# Patient Record
Sex: FEMALE | Race: WHITE | NOT HISPANIC OR LATINO | URBAN - METROPOLITAN AREA
[De-identification: names, ages, dates, MRNs, and addresses within clinical notes are randomized per-mention and may not be internally consistent; named-entity substitution may affect disease eponyms.]

---

## 2017-03-23 ENCOUNTER — OFFICE (OUTPATIENT)
Dept: URBAN - METROPOLITAN AREA CLINIC 11 | Facility: CLINIC | Age: 62
End: 2017-03-23

## 2017-03-23 VITALS
RESPIRATION RATE: 16 BRPM | SYSTOLIC BLOOD PRESSURE: 130 MMHG | HEART RATE: 65 BPM | HEIGHT: 64 IN | DIASTOLIC BLOOD PRESSURE: 68 MMHG | WEIGHT: 231 LBS

## 2017-03-23 DIAGNOSIS — R19.7 DIARRHEA, UNSPECIFIED: ICD-10-CM

## 2017-03-23 DIAGNOSIS — K22.70 BARRETT'S ESOPHAGUS WITHOUT DYSPLASIA: ICD-10-CM

## 2017-03-23 DIAGNOSIS — K75.81 NONALCOHOLIC STEATOHEPATITIS (NASH): ICD-10-CM

## 2017-03-23 LAB
NASH FIBROSURE: ALPHA 2-MACROGLOBULINS, QN: 137 MG/DL (ref 110–276)
NASH FIBROSURE: ALT (SGPT) P5P: 29 IU/L (ref 0–40)
NASH FIBROSURE: APOLIPOPROTEIN A-1: 164 MG/DL (ref 116–209)
NASH FIBROSURE: AST (SGOT) P5P: 24 IU/L (ref 0–40)
NASH FIBROSURE: BILIRUBIN, TOTAL: 0.7 MG/DL (ref 0–1.2)
NASH FIBROSURE: CHOLESTEROL, TOTAL: 208 MG/DL — HIGH (ref 100–199)
NASH FIBROSURE: COMMENT: (no result)
NASH FIBROSURE: FIBROSIS SCORE: 0.09 (ref 0–0.21)
NASH FIBROSURE: FIBROSIS SCORING: (no result)
NASH FIBROSURE: FIBROSIS STAGE: (no result)
NASH FIBROSURE: GGT: 13 IU/L (ref 0–60)
NASH FIBROSURE: GLUCOSE, SERUM: 113 MG/DL — HIGH (ref 65–99)
NASH FIBROSURE: HAPTOGLOBIN: 165 MG/DL (ref 34–200)
NASH FIBROSURE: HEIGHT: 64 INCHES
NASH FIBROSURE: INTERPRETATIONS: (no result)
NASH FIBROSURE: LIMITATIONS: (no result)
NASH FIBROSURE: NASH GRADE: (no result)
NASH FIBROSURE: NASH SCORE: 0.5 (ref 0.25–?)
NASH FIBROSURE: NASH SCORING: (no result)
NASH FIBROSURE: STEATOSIS GRADE: (no result)
NASH FIBROSURE: STEATOSIS GRADING: (no result)
NASH FIBROSURE: STEATOSIS SCORE: 0.82 — HIGH (ref 0–0.3)
NASH FIBROSURE: TRIGLYCERIDES: 171 MG/DL — HIGH (ref 0–149)
NASH FIBROSURE: WEIGHT: 231 LBS

## 2017-03-23 PROCEDURE — 99214 OFFICE O/P EST MOD 30 MIN: CPT

## 2017-03-24 LAB
BASIC METABOLIC PANEL: CALCIUM TOTAL: 10 MG/DL (ref 8.5–10.5)
BASIC METABOLIC PANEL: CARBON DIOXIDE: 27 MEQ/L (ref 21–31)
BASIC METABOLIC PANEL: CHLORIDE: 98 MEQ/L (ref 96–106)
BASIC METABOLIC PANEL: CREATININE: 1 MG/DL (ref 0.6–1.3)
BASIC METABOLIC PANEL: GLUCOSE: 113 MG/DL — HIGH (ref 65–100)
BASIC METABOLIC PANEL: POTASSIUM: 4.5 MEQ/ML (ref 3.5–5.4)
BASIC METABOLIC PANEL: SODIUM: 140 MEQ/L (ref 135–145)
BASIC METABOLIC PANEL: UREA NITROGEN: 23 MG/DL (ref 8–23)
CBCI COMPLETE BLOOD COUNT W/ DIFF: ABS BASOPHILS: 0 K/UL (ref 0–0.3)
CBCI COMPLETE BLOOD COUNT W/ DIFF: ABS EOSINOPHILS: 0.2 K/UL (ref 0.05–0.5)
CBCI COMPLETE BLOOD COUNT W/ DIFF: ABS LYMPHOCYTES: 2.7 K/UL (ref 1–4)
CBCI COMPLETE BLOOD COUNT W/ DIFF: ABS MONOCYTES: 0.6 K/UL (ref 0.1–1.1)
CBCI COMPLETE BLOOD COUNT W/ DIFF: ABS NEUTROPHILS: 3 K/UL (ref 1.8–7)
CBCI COMPLETE BLOOD COUNT W/ DIFF: BASOPHILS: 0.6 % (ref 0–3)
CBCI COMPLETE BLOOD COUNT W/ DIFF: EOSINOPHILS: 3.3 % (ref 1–7)
CBCI COMPLETE BLOOD COUNT W/ DIFF: HEMATOCRIT: 40.2 % (ref 36–48)
CBCI COMPLETE BLOOD COUNT W/ DIFF: HEMOGLOBIN: 13.3 G/DL (ref 12–16)
CBCI COMPLETE BLOOD COUNT W/ DIFF: LYMPHOCYTES: 41.4 % (ref 20–48)
CBCI COMPLETE BLOOD COUNT W/ DIFF: MCH: 30 PG (ref 25–35)
CBCI COMPLETE BLOOD COUNT W/ DIFF: MCHC: 33.1 % (ref 30–38)
CBCI COMPLETE BLOOD COUNT W/ DIFF: MCV: 90.5 FL (ref 78–102)
CBCI COMPLETE BLOOD COUNT W/ DIFF: MONOCYTES: 8.6 % (ref 3–14)
CBCI COMPLETE BLOOD COUNT W/ DIFF: NEUTROPHILS: 46.1 % (ref 40–70)
CBCI COMPLETE BLOOD COUNT W/ DIFF: PLATELET COUNT: 263 K/UL (ref 150–450)
CBCI COMPLETE BLOOD COUNT W/ DIFF: RBC DISTRIBUTION WIDTH: 14.3 % (ref 11.5–16)
CBCI COMPLETE BLOOD COUNT W/ DIFF: RED BLOOD CELL COUNT: 4.44 M/UL (ref 4–5.5)
CBCI COMPLETE BLOOD COUNT W/ DIFF: WHITE BLOOD CELL COUNT: 6.4 K/UL (ref 4–11)
GAMMA GLUTAMYL TRANSFERASE: GGT: 13 U/L (ref 9–64)
HEPATIC FUNCTION PANEL A: ALBUMIN: 4.3 G/DL (ref 3.5–5.2)
HEPATIC FUNCTION PANEL A: ALKALINE PHOSPHATASE: 85 U/L (ref 38–134)
HEPATIC FUNCTION PANEL A: DIRECT BILIRUBIN: 0.2 MG/DL (ref 0–0.2)
HEPATIC FUNCTION PANEL A: SGOT (AST): 18 U/L (ref 13–40)
HEPATIC FUNCTION PANEL A: SGPT (ALT): 27 U/L (ref 7–52)
HEPATIC FUNCTION PANEL A: TOTAL BILIRUBIN: 0.8 MG/DL (ref 0.3–1.2)
HEPATIC FUNCTION PANEL A: TOTAL PROTEIN: 6.8 G/DL (ref 6.4–8.3)
INSULIN: 21.5 UU/ML (ref 2.6–24.9)
MOLECULAR STOOL PATHOGEN PANEL: STPANL: (no result)

## 2017-04-04 ENCOUNTER — AMBULATORY SURGICAL CENTER (OUTPATIENT)
Dept: URBAN - METROPOLITAN AREA SURGERY 3 | Facility: SURGERY | Age: 62
End: 2017-04-04

## 2017-04-04 ENCOUNTER — OFFICE (OUTPATIENT)
Dept: URBAN - METROPOLITAN AREA CLINIC 11 | Facility: CLINIC | Age: 62
End: 2017-04-04

## 2017-04-04 VITALS
DIASTOLIC BLOOD PRESSURE: 56 MMHG | SYSTOLIC BLOOD PRESSURE: 97 MMHG | SYSTOLIC BLOOD PRESSURE: 93 MMHG | TEMPERATURE: 97.3 F | SYSTOLIC BLOOD PRESSURE: 104 MMHG | HEART RATE: 76 BPM | RESPIRATION RATE: 18 BRPM | DIASTOLIC BLOOD PRESSURE: 72 MMHG | HEIGHT: 64 IN | TEMPERATURE: 97.2 F | OXYGEN SATURATION: 94 % | DIASTOLIC BLOOD PRESSURE: 52 MMHG | SYSTOLIC BLOOD PRESSURE: 137 MMHG | HEART RATE: 70 BPM | DIASTOLIC BLOOD PRESSURE: 72 MMHG | RESPIRATION RATE: 16 BRPM | RESPIRATION RATE: 18 BRPM | SYSTOLIC BLOOD PRESSURE: 98 MMHG | OXYGEN SATURATION: 98 % | HEART RATE: 68 BPM | HEIGHT: 64 IN | HEART RATE: 74 BPM | HEART RATE: 74 BPM | HEART RATE: 68 BPM | TEMPERATURE: 97.2 F | RESPIRATION RATE: 12 BRPM | HEART RATE: 76 BPM | RESPIRATION RATE: 22 BRPM | DIASTOLIC BLOOD PRESSURE: 44 MMHG | DIASTOLIC BLOOD PRESSURE: 44 MMHG | DIASTOLIC BLOOD PRESSURE: 52 MMHG | RESPIRATION RATE: 16 BRPM | HEART RATE: 70 BPM | SYSTOLIC BLOOD PRESSURE: 137 MMHG | SYSTOLIC BLOOD PRESSURE: 97 MMHG | OXYGEN SATURATION: 94 % | WEIGHT: 220 LBS | DIASTOLIC BLOOD PRESSURE: 56 MMHG | SYSTOLIC BLOOD PRESSURE: 104 MMHG | WEIGHT: 220 LBS | OXYGEN SATURATION: 99 % | OXYGEN SATURATION: 98 % | RESPIRATION RATE: 12 BRPM | TEMPERATURE: 97.3 F | SYSTOLIC BLOOD PRESSURE: 98 MMHG | DIASTOLIC BLOOD PRESSURE: 47 MMHG | OXYGEN SATURATION: 99 % | SYSTOLIC BLOOD PRESSURE: 93 MMHG | DIASTOLIC BLOOD PRESSURE: 47 MMHG | RESPIRATION RATE: 22 BRPM

## 2017-04-04 DIAGNOSIS — R19.7 DIARRHEA, UNSPECIFIED: ICD-10-CM

## 2017-04-04 DIAGNOSIS — K31.7 POLYP OF STOMACH AND DUODENUM: ICD-10-CM

## 2017-04-04 DIAGNOSIS — K29.50 UNSPECIFIED CHRONIC GASTRITIS WITHOUT BLEEDING: ICD-10-CM

## 2017-04-04 DIAGNOSIS — K64.1 SECOND DEGREE HEMORRHOIDS: ICD-10-CM

## 2017-04-04 DIAGNOSIS — R93.3 ABNORMAL FINDINGS ON DIAGNOSTIC IMAGING OF OTHER PARTS OF DI: ICD-10-CM

## 2017-04-04 PROBLEM — K52.89 CLINICALLY SIGNIFICANT DIARRHEA OF UNEXPLAINED ORIGIN: Status: ACTIVE | Noted: 2017-04-04

## 2017-04-04 PROCEDURE — 45380 COLONOSCOPY AND BIOPSY: CPT

## 2017-04-04 PROCEDURE — 88305 TISSUE EXAM BY PATHOLOGIST: CPT

## 2017-04-04 PROCEDURE — 43239 EGD BIOPSY SINGLE/MULTIPLE: CPT

## 2017-04-04 PROCEDURE — 88342 IMHCHEM/IMCYTCHM 1ST ANTB: CPT

## 2017-04-04 PROCEDURE — 88313 SPECIAL STAINS GROUP 2: CPT

## 2017-05-25 ENCOUNTER — OFFICE (OUTPATIENT)
Dept: URBAN - METROPOLITAN AREA CLINIC 11 | Facility: CLINIC | Age: 62
End: 2017-05-25

## 2017-05-25 VITALS
SYSTOLIC BLOOD PRESSURE: 160 MMHG | DIASTOLIC BLOOD PRESSURE: 80 MMHG | WEIGHT: 225 LBS | RESPIRATION RATE: 16 BRPM | HEART RATE: 64 BPM | HEIGHT: 64 IN

## 2017-05-25 DIAGNOSIS — K21.0 GASTRO-ESOPHAGEAL REFLUX DISEASE WITH ESOPHAGITIS: ICD-10-CM

## 2017-05-25 PROCEDURE — 99213 OFFICE O/P EST LOW 20 MIN: CPT

## 2017-09-06 ENCOUNTER — OFFICE (OUTPATIENT)
Dept: URBAN - METROPOLITAN AREA CLINIC 11 | Facility: CLINIC | Age: 62
End: 2017-09-06

## 2017-09-06 VITALS
WEIGHT: 230 LBS | HEART RATE: 68 BPM | SYSTOLIC BLOOD PRESSURE: 153 MMHG | DIASTOLIC BLOOD PRESSURE: 80 MMHG | HEIGHT: 64 IN

## 2017-09-06 DIAGNOSIS — K21.9 GASTRO-ESOPHAGEAL REFLUX DISEASE WITHOUT ESOPHAGITIS: ICD-10-CM

## 2017-09-06 PROCEDURE — 99213 OFFICE O/P EST LOW 20 MIN: CPT

## 2017-09-18 ENCOUNTER — OFFICE (OUTPATIENT)
Dept: URBAN - METROPOLITAN AREA CLINIC 11 | Facility: CLINIC | Age: 62
End: 2017-09-18

## 2017-09-18 VITALS
SYSTOLIC BLOOD PRESSURE: 128 MMHG | WEIGHT: 224 LBS | DIASTOLIC BLOOD PRESSURE: 57 MMHG | HEART RATE: 76 BPM | HEIGHT: 64 IN

## 2017-09-18 DIAGNOSIS — K21.0 GASTRO-ESOPHAGEAL REFLUX DISEASE WITH ESOPHAGITIS: ICD-10-CM

## 2017-09-18 DIAGNOSIS — R07.9 CHEST PAIN, UNSPECIFIED: ICD-10-CM

## 2017-09-18 PROCEDURE — 99213 OFFICE O/P EST LOW 20 MIN: CPT

## 2017-10-16 ENCOUNTER — OFFICE (OUTPATIENT)
Dept: URBAN - METROPOLITAN AREA CLINIC 11 | Facility: CLINIC | Age: 62
End: 2017-10-16

## 2017-10-16 VITALS
SYSTOLIC BLOOD PRESSURE: 163 MMHG | HEIGHT: 64 IN | DIASTOLIC BLOOD PRESSURE: 78 MMHG | HEART RATE: 63 BPM | WEIGHT: 228 LBS

## 2017-10-16 DIAGNOSIS — K21.0 GASTRO-ESOPHAGEAL REFLUX DISEASE WITH ESOPHAGITIS: ICD-10-CM

## 2017-10-16 DIAGNOSIS — R07.9 CHEST PAIN, UNSPECIFIED: ICD-10-CM

## 2017-10-16 PROCEDURE — 99213 OFFICE O/P EST LOW 20 MIN: CPT

## 2018-03-07 ENCOUNTER — OFFICE (OUTPATIENT)
Dept: URBAN - METROPOLITAN AREA CLINIC 11 | Facility: CLINIC | Age: 63
End: 2018-03-07

## 2018-03-07 VITALS
HEIGHT: 64 IN | SYSTOLIC BLOOD PRESSURE: 163 MMHG | HEART RATE: 57 BPM | WEIGHT: 241 LBS | DIASTOLIC BLOOD PRESSURE: 77 MMHG

## 2018-03-07 DIAGNOSIS — K58.9 IRRITABLE BOWEL SYNDROME WITHOUT DIARRHEA: ICD-10-CM

## 2018-03-07 DIAGNOSIS — K21.0 GASTRO-ESOPHAGEAL REFLUX DISEASE WITH ESOPHAGITIS: ICD-10-CM

## 2018-03-07 DIAGNOSIS — K86.1 OTHER CHRONIC PANCREATITIS: ICD-10-CM

## 2018-03-07 PROCEDURE — 99214 OFFICE O/P EST MOD 30 MIN: CPT

## 2018-03-07 RX ORDER — PROMETHAZINE HYDROCHLORIDE 12.5 MG/1
TABLET ORAL
Qty: 30 | Refills: 3 | Status: ACTIVE

## 2018-03-07 RX ORDER — GLYCOPYRROLATE 1 MG/1
1 TABLET ORAL
Qty: 90 | Refills: 12 | Status: COMPLETED
End: 2019-03-06

## 2018-03-07 RX ORDER — PANCRELIPASE 36000; 180000; 114000 [USP'U]/1; [USP'U]/1; [USP'U]/1
CAPSULE, DELAYED RELEASE PELLETS ORAL
Qty: 200 | Refills: 12 | Status: COMPLETED
End: 2018-09-21

## 2018-09-21 ENCOUNTER — OFFICE (OUTPATIENT)
Dept: URBAN - METROPOLITAN AREA CLINIC 11 | Facility: CLINIC | Age: 63
End: 2018-09-21

## 2018-09-21 VITALS
DIASTOLIC BLOOD PRESSURE: 74 MMHG | WEIGHT: 221 LBS | HEART RATE: 87 BPM | HEIGHT: 64 IN | SYSTOLIC BLOOD PRESSURE: 131 MMHG

## 2018-09-21 DIAGNOSIS — K29.70 GASTRITIS, UNSPECIFIED, WITHOUT BLEEDING: ICD-10-CM

## 2018-09-21 DIAGNOSIS — K86.1 OTHER CHRONIC PANCREATITIS: ICD-10-CM

## 2018-09-21 DIAGNOSIS — K58.9 IRRITABLE BOWEL SYNDROME WITHOUT DIARRHEA: ICD-10-CM

## 2018-09-21 DIAGNOSIS — K21.0 GASTRO-ESOPHAGEAL REFLUX DISEASE WITH ESOPHAGITIS: ICD-10-CM

## 2018-09-21 PROCEDURE — 99213 OFFICE O/P EST LOW 20 MIN: CPT | Performed by: INTERNAL MEDICINE

## 2019-03-06 ENCOUNTER — OFFICE (OUTPATIENT)
Dept: URBAN - METROPOLITAN AREA CLINIC 11 | Facility: CLINIC | Age: 64
End: 2019-03-06

## 2019-03-06 VITALS
HEART RATE: 89 BPM | WEIGHT: 240 LBS | HEIGHT: 64 IN | DIASTOLIC BLOOD PRESSURE: 83 MMHG | SYSTOLIC BLOOD PRESSURE: 155 MMHG

## 2019-03-06 DIAGNOSIS — K21.0 GASTRO-ESOPHAGEAL REFLUX DISEASE WITH ESOPHAGITIS: ICD-10-CM

## 2019-03-06 DIAGNOSIS — K86.1 OTHER CHRONIC PANCREATITIS: ICD-10-CM

## 2019-03-06 DIAGNOSIS — K75.81 NONALCOHOLIC STEATOHEPATITIS (NASH): ICD-10-CM

## 2019-03-06 DIAGNOSIS — K58.9 IRRITABLE BOWEL SYNDROME WITHOUT DIARRHEA: ICD-10-CM

## 2019-03-06 PROCEDURE — 99213 OFFICE O/P EST LOW 20 MIN: CPT | Performed by: INTERNAL MEDICINE

## 2019-03-06 RX ORDER — PANCRELIPASE 24000; 76000; 120000 [USP'U]/1; [USP'U]/1; [USP'U]/1
144 CAPSULE, DELAYED RELEASE PELLETS ORAL
Qty: 540 | Refills: 3 | Status: ACTIVE

## 2019-09-16 ENCOUNTER — OFFICE (OUTPATIENT)
Dept: URBAN - METROPOLITAN AREA CLINIC 11 | Facility: CLINIC | Age: 64
End: 2019-09-16

## 2019-09-16 VITALS
HEART RATE: 69 BPM | HEIGHT: 64 IN | DIASTOLIC BLOOD PRESSURE: 74 MMHG | WEIGHT: 235 LBS | SYSTOLIC BLOOD PRESSURE: 172 MMHG

## 2019-09-16 DIAGNOSIS — K75.81 NONALCOHOLIC STEATOHEPATITIS (NASH): ICD-10-CM

## 2019-09-16 DIAGNOSIS — K86.1 OTHER CHRONIC PANCREATITIS: ICD-10-CM

## 2019-09-16 DIAGNOSIS — K58.9 IRRITABLE BOWEL SYNDROME WITHOUT DIARRHEA: ICD-10-CM

## 2019-09-16 DIAGNOSIS — K21.0 GASTRO-ESOPHAGEAL REFLUX DISEASE WITH ESOPHAGITIS: ICD-10-CM

## 2019-09-16 PROCEDURE — 99213 OFFICE O/P EST LOW 20 MIN: CPT | Performed by: INTERNAL MEDICINE

## 2020-09-21 ENCOUNTER — OFFICE (OUTPATIENT)
Dept: URBAN - METROPOLITAN AREA TELEHEALTH 10 | Facility: TELEHEALTH | Age: 65
End: 2020-09-21

## 2020-09-21 VITALS — HEIGHT: 64 IN

## 2020-09-21 DIAGNOSIS — K58.9 IRRITABLE BOWEL SYNDROME WITHOUT DIARRHEA: ICD-10-CM

## 2020-09-21 DIAGNOSIS — K86.1 OTHER CHRONIC PANCREATITIS: ICD-10-CM

## 2020-09-21 DIAGNOSIS — K75.81 NONALCOHOLIC STEATOHEPATITIS (NASH): ICD-10-CM

## 2020-09-21 DIAGNOSIS — K21.0 GASTRO-ESOPHAGEAL REFLUX DISEASE WITH ESOPHAGITIS: ICD-10-CM

## 2020-09-21 NOTE — SERVICEHPINOTES
65-year-old female with a history of reflux on zegerid, irritable bowel fatty liver and pancreatitis on pancreatic enzymes.  Being  seen today via [facility].  Reports recent issues with diarrhea.  Since coming off lactose her diarrhea has improved.  Denies any fevers vomiting dysphagia abdominal pain bleeding or weight loss.  No recent antibiotics or new medications.  Recently has been unable to lose weight

## 2021-04-01 ENCOUNTER — OFFICE (OUTPATIENT)
Dept: URBAN - METROPOLITAN AREA CLINIC 11 | Facility: CLINIC | Age: 66
End: 2021-04-01

## 2021-04-01 VITALS
SYSTOLIC BLOOD PRESSURE: 184 MMHG | DIASTOLIC BLOOD PRESSURE: 78 MMHG | HEIGHT: 64 IN | WEIGHT: 240 LBS | HEART RATE: 54 BPM | OXYGEN SATURATION: 97 %

## 2021-04-01 DIAGNOSIS — K21.0 GASTRO-ESOPHAGEAL REFLUX DISEASE WITH ESOPHAGITIS: ICD-10-CM

## 2021-04-01 DIAGNOSIS — K75.81 NONALCOHOLIC STEATOHEPATITIS (NASH): ICD-10-CM

## 2021-04-01 DIAGNOSIS — K86.1 OTHER CHRONIC PANCREATITIS: ICD-10-CM

## 2021-04-01 DIAGNOSIS — K58.9 IRRITABLE BOWEL SYNDROME WITHOUT DIARRHEA: ICD-10-CM

## 2021-04-01 DIAGNOSIS — R19.7 DIARRHEA, UNSPECIFIED: ICD-10-CM

## 2021-04-01 LAB
CBC, PLATELET, NO DIFFERENTIAL: HEMATOCRIT: 43.9 % (ref 34–46.6)
CBC, PLATELET, NO DIFFERENTIAL: HEMOGLOBIN: 14.5 G/DL (ref 11.1–15.9)
CBC, PLATELET, NO DIFFERENTIAL: MCH: 31.5 PG (ref 26.6–33)
CBC, PLATELET, NO DIFFERENTIAL: MCHC: 33 G/DL (ref 31.5–35.7)
CBC, PLATELET, NO DIFFERENTIAL: MCV: 95 FL (ref 79–97)
CBC, PLATELET, NO DIFFERENTIAL: PLATELETS: 249 X10E3/UL (ref 150–450)
CBC, PLATELET, NO DIFFERENTIAL: RBC: 4.61 X10E6/UL (ref 3.77–5.28)
CBC, PLATELET, NO DIFFERENTIAL: RDW: 13.3 % (ref 11.7–15.4)
CBC, PLATELET, NO DIFFERENTIAL: WBC: 8.2 X10E3/UL (ref 3.4–10.8)
HEPATIC FUNCTION PANEL (7): ALBUMIN: 4.2 G/DL (ref 3.8–4.8)
HEPATIC FUNCTION PANEL (7): ALKALINE PHOSPHATASE: 89 IU/L (ref 39–117)
HEPATIC FUNCTION PANEL (7): ALT (SGPT): 24 IU/L (ref 0–32)
HEPATIC FUNCTION PANEL (7): AST (SGOT): 16 IU/L (ref 0–40)
HEPATIC FUNCTION PANEL (7): BILIRUBIN, DIRECT: 0.16 MG/DL (ref 0–0.4)
HEPATIC FUNCTION PANEL (7): BILIRUBIN, TOTAL: 0.5 MG/DL (ref 0–1.2)
HEPATIC FUNCTION PANEL (7): PROTEIN, TOTAL: 6.5 G/DL (ref 6–8.5)

## 2021-04-01 PROCEDURE — 99214 OFFICE O/P EST MOD 30 MIN: CPT | Performed by: INTERNAL MEDICINE

## 2022-05-02 ENCOUNTER — OFFICE (OUTPATIENT)
Dept: URBAN - METROPOLITAN AREA CLINIC 11 | Facility: CLINIC | Age: 67
End: 2022-05-02

## 2022-05-02 VITALS
WEIGHT: 241 LBS | OXYGEN SATURATION: 97 % | DIASTOLIC BLOOD PRESSURE: 82 MMHG | HEIGHT: 64 IN | SYSTOLIC BLOOD PRESSURE: 154 MMHG

## 2022-05-02 DIAGNOSIS — K21.00 GASTRO-ESOPHAGEAL REFLUX DISEASE WITH ESOPHAGITIS, WITHOUT B: ICD-10-CM

## 2022-05-02 DIAGNOSIS — K58.9 IRRITABLE BOWEL SYNDROME WITHOUT DIARRHEA: ICD-10-CM

## 2022-05-02 DIAGNOSIS — K75.81 NONALCOHOLIC STEATOHEPATITIS (NASH): ICD-10-CM

## 2022-05-02 DIAGNOSIS — K86.1 OTHER CHRONIC PANCREATITIS: ICD-10-CM

## 2022-05-02 PROCEDURE — 99214 OFFICE O/P EST MOD 30 MIN: CPT | Performed by: INTERNAL MEDICINE

## 2022-05-02 RX ORDER — PROMETHAZINE HYDROCHLORIDE 12.5 MG/1
TABLET ORAL
Qty: 30 | Refills: 3 | Status: ACTIVE

## 2022-05-02 RX ORDER — PANCRELIPASE 24000; 76000; 120000 [USP'U]/1; [USP'U]/1; [USP'U]/1
144 CAPSULE, DELAYED RELEASE PELLETS ORAL
Qty: 540 | Refills: 3 | Status: ACTIVE

## 2023-05-15 ENCOUNTER — OFFICE (OUTPATIENT)
Dept: URBAN - METROPOLITAN AREA CLINIC 11 | Facility: CLINIC | Age: 68
End: 2023-05-15

## 2023-05-15 VITALS — HEIGHT: 64 IN

## 2023-05-15 DIAGNOSIS — K86.1 OTHER CHRONIC PANCREATITIS: ICD-10-CM

## 2023-05-15 DIAGNOSIS — K21.00 GASTRO-ESOPHAGEAL REFLUX DISEASE WITH ESOPHAGITIS, WITHOUT B: ICD-10-CM

## 2023-05-15 DIAGNOSIS — R11.0 NAUSEA: ICD-10-CM

## 2023-05-15 DIAGNOSIS — K58.9 IRRITABLE BOWEL SYNDROME WITHOUT DIARRHEA: ICD-10-CM

## 2023-05-15 PROCEDURE — 99213 OFFICE O/P EST LOW 20 MIN: CPT | Mod: 95 | Performed by: INTERNAL MEDICINE

## 2023-05-15 RX ORDER — PANCRELIPASE 24000; 76000; 120000 [USP'U]/1; [USP'U]/1; [USP'U]/1
144 CAPSULE, DELAYED RELEASE PELLETS ORAL
Qty: 540 | Refills: 3 | Status: ACTIVE

## 2023-05-15 RX ORDER — PROMETHAZINE HYDROCHLORIDE 12.5 MG/1
TABLET ORAL
Qty: 30 | Refills: 3 | Status: ACTIVE

## 2023-05-15 RX ORDER — GLYCOPYRROLATE 2 MG/1
TABLET ORAL
Qty: 90 | Refills: 3 | Status: ACTIVE

## 2023-05-15 NOTE — SERVICEHPINOTES
68-year-old female being seen via a tele health visit  for routine follow-up.  Recent mild reflux symptoms but overall doing well from a GI standpoint.  Compliant with her medications.  Denies any significant new medical problems or you GI symptoms.  Last colonoscopy and upper endoscopy in 2017. she is on reflux, IBS and chronic pancreatitis therapy.  She does use Phenergan p.r.n..

## 2024-05-20 ENCOUNTER — OFFICE (OUTPATIENT)
Dept: URBAN - METROPOLITAN AREA CLINIC 11 | Facility: CLINIC | Age: 69
End: 2024-05-20
Payer: OTHER GOVERNMENT

## 2024-05-20 VITALS
WEIGHT: 226 LBS | HEART RATE: 77 BPM | OXYGEN SATURATION: 96 % | HEIGHT: 64 IN | SYSTOLIC BLOOD PRESSURE: 172 MMHG | DIASTOLIC BLOOD PRESSURE: 78 MMHG

## 2024-05-20 DIAGNOSIS — K86.1 OTHER CHRONIC PANCREATITIS: ICD-10-CM

## 2024-05-20 DIAGNOSIS — K75.81 NONALCOHOLIC STEATOHEPATITIS (NASH): ICD-10-CM

## 2024-05-20 DIAGNOSIS — K21.00 GASTRO-ESOPHAGEAL REFLUX DISEASE WITH ESOPHAGITIS, WITHOUT B: ICD-10-CM

## 2024-05-20 DIAGNOSIS — K58.9 IRRITABLE BOWEL SYNDROME WITHOUT DIARRHEA: ICD-10-CM

## 2024-05-20 PROCEDURE — 99214 OFFICE O/P EST MOD 30 MIN: CPT | Performed by: INTERNAL MEDICINE

## 2024-05-20 RX ORDER — GLYCOPYRROLATE 2 MG/1
TABLET ORAL
Qty: 90 | Refills: 3 | Status: ACTIVE

## 2024-05-20 RX ORDER — PANCRELIPASE 24000; 76000; 120000 [USP'U]/1; [USP'U]/1; [USP'U]/1
144 CAPSULE, DELAYED RELEASE PELLETS ORAL
Qty: 540 | Refills: 3 | Status: ACTIVE

## 2024-05-20 RX ORDER — PROMETHAZINE HYDROCHLORIDE 12.5 MG/1
TABLET ORAL
Qty: 30 | Refills: 3 | Status: ACTIVE